# Patient Record
Sex: FEMALE | Race: WHITE | Employment: FULL TIME
[De-identification: names, ages, dates, MRNs, and addresses within clinical notes are randomized per-mention and may not be internally consistent; named-entity substitution may affect disease eponyms.]

---

## 2023-03-08 ENCOUNTER — HOSPITAL ENCOUNTER (OUTPATIENT)
Facility: HOSPITAL | Age: 53
Discharge: HOME | End: 2023-03-11

## 2023-03-08 ENCOUNTER — HOSPITAL ENCOUNTER (OUTPATIENT)
Facility: HOSPITAL | Age: 53
Discharge: HOME | End: 2023-03-11
Payer: COMMERCIAL

## 2023-03-08 LAB
EKG ATRIAL RATE: 66 BPM
EKG DIAGNOSIS: NORMAL
EKG P AXIS: 72 DEGREES
EKG P-R INTERVAL: 136 MS
EKG Q-T INTERVAL: 378 MS
EKG QRS DURATION: 82 MS
EKG QTC CALCULATION (BAZETT): 396 MS
EKG R AXIS: 69 DEGREES
EKG T AXIS: 55 DEGREES
EKG VENTRICULAR RATE: 66 BPM
SENTARA SPECIMEN COLLECTION: NORMAL

## 2023-03-08 PROCEDURE — 99001 SPECIMEN HANDLING PT-LAB: CPT

## 2023-03-08 PROCEDURE — 93005 ELECTROCARDIOGRAM TRACING: CPT | Performed by: ORTHOPAEDIC SURGERY

## 2023-03-08 NOTE — PERIOP NOTE
Neck 14 inches. Denies sleep apnea, diabetes, liver or kidney diseases. Not on lithium or digoxin. Has RA. Added EKG.

## 2023-03-16 ENCOUNTER — ANESTHESIA EVENT (OUTPATIENT)
Facility: HOSPITAL | Age: 53
End: 2023-03-16
Payer: COMMERCIAL

## 2023-03-21 ENCOUNTER — HOSPITAL ENCOUNTER (OUTPATIENT)
Facility: HOSPITAL | Age: 53
Setting detail: OUTPATIENT SURGERY
Discharge: HOME | End: 2023-03-21
Attending: ORTHOPAEDIC SURGERY | Admitting: ORTHOPAEDIC SURGERY
Payer: COMMERCIAL

## 2023-03-21 ENCOUNTER — ANESTHESIA (OUTPATIENT)
Facility: HOSPITAL | Age: 53
End: 2023-03-21
Payer: COMMERCIAL

## 2023-03-21 VITALS
TEMPERATURE: 97.2 F | HEIGHT: 64 IN | DIASTOLIC BLOOD PRESSURE: 75 MMHG | OXYGEN SATURATION: 99 % | BODY MASS INDEX: 28.53 KG/M2 | SYSTOLIC BLOOD PRESSURE: 138 MMHG | WEIGHT: 167.1 LBS | RESPIRATION RATE: 16 BRPM | HEART RATE: 75 BPM

## 2023-03-21 DIAGNOSIS — M20.41 HAMMER TOE OF RIGHT FOOT: Primary | ICD-10-CM

## 2023-03-21 PROCEDURE — C9290 INJ, BUPIVACAINE LIPOSOME: HCPCS | Performed by: ORTHOPAEDIC SURGERY

## 2023-03-21 PROCEDURE — 7100000010 HC PHASE II RECOVERY - FIRST 15 MIN: Performed by: ORTHOPAEDIC SURGERY

## 2023-03-21 PROCEDURE — 6360000002 HC RX W HCPCS: Performed by: ANESTHESIOLOGY

## 2023-03-21 PROCEDURE — 2500000003 HC RX 250 WO HCPCS: Performed by: ANESTHESIOLOGY

## 2023-03-21 PROCEDURE — 81025 URINE PREGNANCY TEST: CPT

## 2023-03-21 PROCEDURE — 2580000003 HC RX 258: Performed by: ORTHOPAEDIC SURGERY

## 2023-03-21 PROCEDURE — 3600000002 HC SURGERY LEVEL 2 BASE: Performed by: ORTHOPAEDIC SURGERY

## 2023-03-21 PROCEDURE — 3600000012 HC SURGERY LEVEL 2 ADDTL 15MIN: Performed by: ORTHOPAEDIC SURGERY

## 2023-03-21 PROCEDURE — 6360000002 HC RX W HCPCS: Performed by: ORTHOPAEDIC SURGERY

## 2023-03-21 PROCEDURE — 3700000000 HC ANESTHESIA ATTENDED CARE: Performed by: ORTHOPAEDIC SURGERY

## 2023-03-21 PROCEDURE — 7100000000 HC PACU RECOVERY - FIRST 15 MIN: Performed by: ORTHOPAEDIC SURGERY

## 2023-03-21 PROCEDURE — 7100000011 HC PHASE II RECOVERY - ADDTL 15 MIN: Performed by: ORTHOPAEDIC SURGERY

## 2023-03-21 PROCEDURE — 7100000001 HC PACU RECOVERY - ADDTL 15 MIN: Performed by: ORTHOPAEDIC SURGERY

## 2023-03-21 PROCEDURE — 3700000001 HC ADD 15 MINUTES (ANESTHESIA): Performed by: ORTHOPAEDIC SURGERY

## 2023-03-21 PROCEDURE — 2500000003 HC RX 250 WO HCPCS: Performed by: ORTHOPAEDIC SURGERY

## 2023-03-21 PROCEDURE — 2709999900 HC NON-CHARGEABLE SUPPLY: Performed by: ORTHOPAEDIC SURGERY

## 2023-03-21 PROCEDURE — 6370000000 HC RX 637 (ALT 250 FOR IP): Performed by: ORTHOPAEDIC SURGERY

## 2023-03-21 RX ORDER — PHENYLEPHRINE HCL IN 0.9% NACL 1 MG/10 ML
SYRINGE (ML) INTRAVENOUS PRN
Status: DISCONTINUED | OUTPATIENT
Start: 2023-03-21 | End: 2023-03-21 | Stop reason: SDUPTHER

## 2023-03-21 RX ORDER — GLYCOPYRROLATE 0.2 MG/ML
INJECTION INTRAMUSCULAR; INTRAVENOUS PRN
Status: DISCONTINUED | OUTPATIENT
Start: 2023-03-21 | End: 2023-03-21 | Stop reason: SDUPTHER

## 2023-03-21 RX ORDER — ONDANSETRON 2 MG/ML
INJECTION INTRAMUSCULAR; INTRAVENOUS PRN
Status: DISCONTINUED | OUTPATIENT
Start: 2023-03-21 | End: 2023-03-21 | Stop reason: SDUPTHER

## 2023-03-21 RX ORDER — IPRATROPIUM BROMIDE AND ALBUTEROL SULFATE 2.5; .5 MG/3ML; MG/3ML
1 SOLUTION RESPIRATORY (INHALATION)
Status: DISCONTINUED | OUTPATIENT
Start: 2023-03-21 | End: 2023-03-21 | Stop reason: HOSPADM

## 2023-03-21 RX ORDER — SODIUM CHLORIDE, SODIUM LACTATE, POTASSIUM CHLORIDE, CALCIUM CHLORIDE 600; 310; 30; 20 MG/100ML; MG/100ML; MG/100ML; MG/100ML
INJECTION, SOLUTION INTRAVENOUS CONTINUOUS
Status: DISCONTINUED | OUTPATIENT
Start: 2023-03-21 | End: 2023-03-21 | Stop reason: HOSPADM

## 2023-03-21 RX ORDER — BUPIVACAINE HYDROCHLORIDE 5 MG/ML
INJECTION, SOLUTION EPIDURAL; INTRACAUDAL PRN
Status: DISCONTINUED | OUTPATIENT
Start: 2023-03-21 | End: 2023-03-21 | Stop reason: ALTCHOICE

## 2023-03-21 RX ORDER — FENTANYL CITRATE 50 UG/ML
INJECTION, SOLUTION INTRAMUSCULAR; INTRAVENOUS PRN
Status: DISCONTINUED | OUTPATIENT
Start: 2023-03-21 | End: 2023-03-21 | Stop reason: SDUPTHER

## 2023-03-21 RX ORDER — LIDOCAINE HYDROCHLORIDE 20 MG/ML
INJECTION, SOLUTION EPIDURAL; INFILTRATION; INTRACAUDAL; PERINEURAL PRN
Status: DISCONTINUED | OUTPATIENT
Start: 2023-03-21 | End: 2023-03-21 | Stop reason: SDUPTHER

## 2023-03-21 RX ORDER — OXYCODONE HYDROCHLORIDE AND ACETAMINOPHEN 5; 325 MG/1; MG/1
1-2 TABLET ORAL EVERY 6 HOURS PRN
Qty: 28 TABLET | Refills: 0 | Status: SHIPPED | OUTPATIENT
Start: 2023-03-21 | End: 2023-03-28

## 2023-03-21 RX ORDER — METOCLOPRAMIDE HYDROCHLORIDE 5 MG/ML
INJECTION INTRAMUSCULAR; INTRAVENOUS PRN
Status: DISCONTINUED | OUTPATIENT
Start: 2023-03-21 | End: 2023-03-21 | Stop reason: SDUPTHER

## 2023-03-21 RX ORDER — FENTANYL CITRATE 50 UG/ML
25 INJECTION, SOLUTION INTRAMUSCULAR; INTRAVENOUS EVERY 5 MIN PRN
Status: DISCONTINUED | OUTPATIENT
Start: 2023-03-21 | End: 2023-03-21 | Stop reason: HOSPADM

## 2023-03-21 RX ORDER — SODIUM CHLORIDE 9 MG/ML
INJECTION, SOLUTION INTRAVENOUS PRN
Status: DISCONTINUED | OUTPATIENT
Start: 2023-03-21 | End: 2023-03-21 | Stop reason: HOSPADM

## 2023-03-21 RX ORDER — ONDANSETRON 4 MG/1
4 TABLET, FILM COATED ORAL 3 TIMES DAILY PRN
Qty: 15 TABLET | Refills: 0 | Status: SHIPPED | OUTPATIENT
Start: 2023-03-21

## 2023-03-21 RX ORDER — HYDROMORPHONE HYDROCHLORIDE 1 MG/ML
0.5 INJECTION, SOLUTION INTRAMUSCULAR; INTRAVENOUS; SUBCUTANEOUS EVERY 5 MIN PRN
Status: DISCONTINUED | OUTPATIENT
Start: 2023-03-21 | End: 2023-03-21 | Stop reason: HOSPADM

## 2023-03-21 RX ORDER — DEXAMETHASONE SODIUM PHOSPHATE 4 MG/ML
INJECTION, SOLUTION INTRA-ARTICULAR; INTRALESIONAL; INTRAMUSCULAR; INTRAVENOUS; SOFT TISSUE PRN
Status: DISCONTINUED | OUTPATIENT
Start: 2023-03-21 | End: 2023-03-21 | Stop reason: SDUPTHER

## 2023-03-21 RX ORDER — SODIUM CHLORIDE 0.9 % (FLUSH) 0.9 %
5-40 SYRINGE (ML) INJECTION PRN
Status: DISCONTINUED | OUTPATIENT
Start: 2023-03-21 | End: 2023-03-21 | Stop reason: HOSPADM

## 2023-03-21 RX ORDER — SODIUM CHLORIDE 0.9 % (FLUSH) 0.9 %
5-40 SYRINGE (ML) INJECTION EVERY 12 HOURS SCHEDULED
Status: DISCONTINUED | OUTPATIENT
Start: 2023-03-21 | End: 2023-03-21 | Stop reason: HOSPADM

## 2023-03-21 RX ORDER — MIDAZOLAM HYDROCHLORIDE 1 MG/ML
INJECTION INTRAMUSCULAR; INTRAVENOUS PRN
Status: DISCONTINUED | OUTPATIENT
Start: 2023-03-21 | End: 2023-03-21 | Stop reason: SDUPTHER

## 2023-03-21 RX ADMIN — SODIUM CHLORIDE, SODIUM LACTATE, POTASSIUM CHLORIDE, AND CALCIUM CHLORIDE: 600; 310; 30; 20 INJECTION, SOLUTION INTRAVENOUS at 12:38

## 2023-03-21 RX ADMIN — FENTANYL CITRATE 100 MCG: 50 INJECTION, SOLUTION INTRAMUSCULAR; INTRAVENOUS at 12:41

## 2023-03-21 RX ADMIN — METOCLOPRAMIDE 10 MG: 5 INJECTION, SOLUTION INTRAMUSCULAR; INTRAVENOUS at 13:30

## 2023-03-21 RX ADMIN — Medication 100 MCG: at 13:26

## 2023-03-21 RX ADMIN — LIDOCAINE HYDROCHLORIDE 100 MG: 20 INJECTION, SOLUTION EPIDURAL; INFILTRATION; INTRACAUDAL; PERINEURAL at 12:46

## 2023-03-21 RX ADMIN — SODIUM CHLORIDE, SODIUM LACTATE, POTASSIUM CHLORIDE, AND CALCIUM CHLORIDE: 600; 310; 30; 20 INJECTION, SOLUTION INTRAVENOUS at 13:12

## 2023-03-21 RX ADMIN — ONDANSETRON HYDROCHLORIDE 4 MG: 2 INJECTION INTRAMUSCULAR; INTRAVENOUS at 13:30

## 2023-03-21 RX ADMIN — GLYCOPYRROLATE 0.2 MG: 0.2 INJECTION, SOLUTION INTRAMUSCULAR; INTRAVENOUS at 13:05

## 2023-03-21 RX ADMIN — DEXAMETHASONE SODIUM PHOSPHATE 4 MG: 4 INJECTION, SOLUTION INTRAMUSCULAR; INTRAVENOUS at 13:30

## 2023-03-21 RX ADMIN — MIDAZOLAM 2 MG: 1 INJECTION INTRAMUSCULAR; INTRAVENOUS at 12:38

## 2023-03-21 RX ADMIN — SODIUM CHLORIDE, SODIUM LACTATE, POTASSIUM CHLORIDE, AND CALCIUM CHLORIDE: 600; 310; 30; 20 INJECTION, SOLUTION INTRAVENOUS at 11:34

## 2023-03-21 RX ADMIN — Medication 2 G: at 12:53

## 2023-03-21 ASSESSMENT — LIFESTYLE VARIABLES: SMOKING_STATUS: 0

## 2023-03-21 ASSESSMENT — PAIN SCALES - GENERAL: PAINLEVEL_OUTOF10: 5

## 2023-03-21 NOTE — PERIOP NOTE
TRANSFER - OUT REPORT:    Verbal report given to [address] on Ally Aguiar  being transferred to Phase 2 for routine progression of patient care       Report consisted of patient's Situation, Background, Assessment and   Recommendations(SBAR). Information from the following report(s) Adult Overview, Surgery Report, Intake/Output, and MAR was reviewed with the receiving nurse. [place] Assessment: No data recorded  Lines:   Peripheral IV 03/21/23 Right;Ventral Forearm (Active)   Site Assessment Clean, dry & intact 03/21/23 1403   Line Status Infusing 03/21/23 1403   Phlebitis Assessment No symptoms 03/21/23 1403   Infiltration Assessment 0 03/21/23 1403   Dressing Status Clean, dry & intact 03/21/23 1403   Dressing Type Transparent 03/21/23 1403        Opportunity for questions and clarification was provided.       Patient transported with:  Registered Nurse

## 2023-03-21 NOTE — INTERVAL H&P NOTE
Update History & Physical    The patient's History and Physical was reviewed with the patient and I examined the patient. There was no change. The surgical site was confirmed by the patient and me. Plan: The risks, benefits, expected outcome, and alternative to the recommended procedure have been discussed with the patient. Patient understands and wants to proceed with the procedure.      Electronically signed by Petra Alvarez MD on 3/21/2023 at 12:09 PM

## 2023-03-21 NOTE — DISCHARGE INSTRUCTIONS
DISCHARGE SUMMARY from Nurse    PATIENT INSTRUCTIONS:    After general anesthesia or intravenous sedation, for 24 hours or while taking prescription Narcotics:  Limit your activities  Do not drive and operate hazardous machinery  Do not make important personal or business decisions  Do  not drink alcoholic beverages  If you have not urinated within 8 hours after discharge, please contact your surgeon on call. Report the following to your surgeon:  Excessive pain, swelling, redness or odor of or around the surgical area  Temperature over 100.5  Nausea and vomiting lasting longer than 4 hours or if unable to take medications  Any signs of decreased circulation or nerve impairment to extremity: change in color, persistent  numbness, tingling, coldness or increase pain  Any questions    What to do at Home:  Recommended activity: ,   REGULAR DIET  AMBULATE WITH BOOT ON AND WEIGHT TO [facility] TO OFFICE AS SCHEDULED    If you experience any of the following symptoms heavy bleeding, fevers, severe pain, circulation changes, please follow up with dr Sea Silveira. *  Please give a list of your current medications to your Primary Care Provider. *  Please update this list whenever your medications are discontinued, doses are      changed, or new medications (including over-the-counter products) are added. *  Please carry medication information at all times in case of emergency situations. These are general instructions for a healthy lifestyle:    No smoking/ No tobacco products/ Avoid exposure to second hand smoke  Surgeon General's Warning:  Quitting smoking now greatly reduces serious risk to your health.     Obesity, smoking, and sedentary lifestyle greatly increases your risk for illness    A healthy diet, regular physical exercise & weight monitoring are important for maintaining a healthy lifestyle    You may be retaining fluid if you have a history of heart failure or if you

## 2023-03-21 NOTE — PERIOP NOTE
Reviewed PTA medication list with patient/caregiver and patient/caregiver denies any additional medications. Patient admits to having a responsible adult care for them at home for at least 24 hours after surgery. Patient encouraged to use gown warming system and informed that using said warming gown to regulate body temperature prior to a procedure has been shown to help reduce the risks of blood clots and infection. Patient's pharmacy of choice verified and documented in PTA medication section. Dual skin assessment & fall risk band verification completed with Salvador Reynolds RN.

## 2023-03-21 NOTE — ANESTHESIA PRE PROCEDURE
GERD (gastroesophageal reflux disease)     Rheumatoid arthritis ([facility] [address]     Wears glasses        Past Surgical History:        Procedure Laterality Date    RECTOCELE REPAIR  2020    SINUS SURGERY      age 22   [de-identified] TUBAL LIGATION  2008       Social History:    Social History     Tobacco Use    Smoking status: Never    Smokeless tobacco: Never   Substance Use Topics    Alcohol use: Yes     Alcohol/week: 1.0 standard drink     Types: 1 Glasses of wine per week     Comment: social                                Counseling given: Not Answered      Vital Signs (Current):   Vitals:    03/08/23 0906 03/21/23 1107   BP:  129/71   Pulse:  71   Resp:  16   Temp:  97.8 °F (36.6 °C)   TempSrc:  Temporal   SpO2:  96%   Weight: 166 lb 11.2 oz (75.6 kg) 167 lb 1.6 oz (75.8 kg)   Height: 5' 3.5\" (1.613 m) 5' 3.5\" (1.613 m)                                              BP Readings from Last 3 Encounters:   03/21/23 129/71       NPO Status: Time of last liquid consumption: 0800 (sip of water with meds)                        Time of last solid consumption: 1800                        Date of last liquid consumption: 03/21/23                        Date of last solid food consumption: 03/20/23    BMI:   Wt Readings from Last 3 Encounters:   03/21/23 167 lb 1.6 oz (75.8 kg)   03/10/23 167 lb (75.8 kg)     Body mass index is 29.14 kg/m². CBC: No results found for: WBC, RBC, HGB, HCT, MCV, RDW, PLT    CMP: No results found for: NA, K, CL, CO2, BUN, CREATININE, GFRAA, AGRATIO, LABGLOM, GLUCOSE, GLU, PROT, CALCIUM, BILITOT, ALKPHOS, AST, ALT    POC Tests: No results for input(s): POCGLU, POCNA, POCK, POCCL, POCBUN, POCHEMO, POCHCT in the last 72 hours.     Coags: No results found for: PROTIME, INR, APTT    HCG (If Applicable): No results found for: PREGTESTUR, PREGSERUM, HCG, HCGQUANT     ABGs: No results found for: PHART, PO2ART, WTN3RAC, QPU7TWU, BEART, [id number]     Type & Screen (If Applicable):  No results found for: LABABO,

## 2023-03-21 NOTE — H&P
instability of the 2nd and 3rd toes. Plan: We discussed nonoperative treatment including Boudin splinting. She has already modified her shoes. She is on immune modulators. She has had injections in the joint, and she would like something more permanent if possible. We discussed that she does have rheumatoid arthritis, and I do not think this would control all of her metatarsophalangeal joints, but since most of the symptoms are at the PIP joint I would recommend a 2nd and 3rd metatarsophalangeal joint open tendon lengthening, capsulotomy, PIP joint resection arthroplasty of the 2nd toe, pinning of the 2nd and 3rd toes. She understands she will be heel weightbearing immediately in a short boot. She can roll through once the wires are out. As far as returning to work, hopefully she can return to work as soon as she is able to tolerate her foot being dependent. This can be an outpatient procedure and lasts about an hour and a half. She would need to talk to the rheumatologist. Most likely she would need to hold her Orencia the week before and for two weeks after to let the wound heal. Risk of infection is low. Recurrent deformity as well as arthritis in the MTP joints would be common with rheumatoid arthritis. She is present for the dictation and understands the plan. We have issued a Cam walker boot today in the office. Dm Steward MD/ Stefanie Danielson  (No PCP listed)

## 2023-03-21 NOTE — BRIEF OP NOTE
Brief Postoperative Note      Patient: Yonatan Laird  YOB: 1970  MRN: [id number]    Date of Procedure: 3/21/2023    Pre-Op Diagnosis: RIGHT FOOT RHEUMATOID ARTHRITIS WITH SECOND AND THIRD METATARSAL PHALANGEAL JOINT AND HAMMER TOE OF THE SECOND    Post-Op Diagnosis: Same       Procedure(s):  RIGHT FOOT HAMMER TOE CORRECTION OF THE SECOND AND  THE THIRD TOE CAPSULOTOMY AND TENOTOMY AND TENDON LENGHTENING OF THE SECOND AND THIRD METATARSAL PHALANGEAL JOINT WITH MINI C-ARM    Surgeon(s):  Petra Alvarez MD    Assistant:  Surgical Assistant: Kenney Silveira    Anesthesia: General    Estimated Blood Loss (mL): less than 50     Complications: None    Specimens:   * No specimens in log *    Implants:  * No implants in log *      Drains: * No LDAs found *    Findings: tight EDL, synovitis 2nd MTP, tight PIP    Electronically signed by Petra Alvarez MD on 3/21/2023 at 1:39 PM

## 2023-03-21 NOTE — PERIOP NOTE
TRANSFER - IN REPORT:    Verbal report received from [address] r[address] on Ally Blades  being received from pacu for routine post-op      Report consisted of patient's Situation, Background, Assessment and   Recommendations(SBAR). Information from the following report(s) Nurse Handoff Report was reviewed with the receiving nurse. Opportunity for questions and clarification was provided. Assessment completed upon patient's arrival to unit and care assumed.

## 2023-03-22 LAB — HCG UR QL: NEGATIVE

## 2023-03-22 NOTE — ANESTHESIA POSTPROCEDURE EVALUATION
Department of Anesthesiology  Postprocedure Note    Patient: Fanny Beltran  MRN: [id number]  YOB: 1970  Date of evaluation: 3/22/2023      Procedure Summary     Date: 03/21/23 Room / Location: THE [facility] 06 / [facility] MAIN OR    Anesthesia Start: 1238 Anesthesia Stop: 1346    Procedure: RIGHT FOOT HAMMER TOE CORRECTION OF THE SECOND AND  THE THIRD TOE CAPSULOTOMY AND TENOTOMY AND TENDON LENGHTENING OF THE SECOND AND THIRD METATARSAL PHALANGEAL JOINT WITH MINI C-ARM (Right: Foot) Diagnosis:       Right foot pain      (RIGHT FOOT RHEUMATOID ARTHRITIS WITH SECOND AND THIRD METATARSAL PHALANGEAL JOINT AND HAMMER TOE OF THE SECOND)    Surgeons: Maurice Choi MD Responsible Provider: Lainey Steinberg MD    Anesthesia Type: General ASA Status: 3          Anesthesia Type: General    Sam Phase I: Sam Score: 9    Sam Phase II: Sam Score: 9      Anesthesia Post Evaluation    Patient location during evaluation: PACU  Patient participation: complete - patient participated  Level of consciousness: awake and alert  Pain score: 0  Airway patency: patent  Nausea & Vomiting: no nausea and no vomiting  Complications: no  Cardiovascular status: blood pressure returned to baseline  Respiratory status: acceptable  Hydration status: euvolemic  Multimodal analgesia pain management approach

## 2023-03-22 NOTE — OP NOTE
fair amount of synovitis in the second MTP joint. We debrided, used a McGlamry to free up the joint, and then proceeded to the third metatarsophalangeal joint. Again, an open Z-lengthening of the EDL, cutting the EDB, capsulotomy, not quite as much synovitis in the second toe, but again, the Sher Camron was used to free up the plantar aspect of the toe. I then performed a PIP joint resection arthroplasty of the second, taking an ellipse of skin over the distal aspect of the proximal phalanx, and taking the proximal phalanx bone at the distal aspect, at the neck of the proximal phalanx. After this, I took a 0.045 K-wire out the tip of the toe and down the proximal phalanx. I then went to the third toe, which was still slightly fixed and again, ellipsed out skin, took a small portion of the distal aspect of proximal phalanx. I put the wire up the tip of the toe, then down the proximal phalanx using fluoroscopic assistance, ensuring we were at the center-center position over the proximal aspect of the proximal phalanx, reduced the metatarsophalangeal joint, placed in slight plantar flexion, and pinned the wire across the MTP joint. This has good cascade, good alignment, good capillary refill. I washed the wounds out with normal saline with Betadine. I repaired the extensor digitorum longus with Ethibond. I closed the skin with Vicryl and then nylon. We closed the skin for the hammertoes with nylon only. We then placed pin caps, washed the foot and placed Xeroform, soft dressing, short boot. She was extubated, transferred to Recovery without evidence of intraoperative complications. She will be heel weightbearing, see me in the office in two weeks for wound check, dressing change. Medications will be sent to her pharmacy.       MD NAIDA [email or web address]/[email or web address]  D:  03/21/2023 13:43  T:  03/22/2023 1:01  JOB #:  [id number]

## 2023-10-25 ENCOUNTER — OFFICE VISIT (OUTPATIENT)
Age: 53
End: 2023-10-25

## 2023-10-25 VITALS — WEIGHT: 164 LBS | BODY MASS INDEX: 29.06 KG/M2 | HEIGHT: 63 IN

## 2023-10-25 DIAGNOSIS — Z87.39 HISTORY OF RHEUMATOID ARTHRITIS: ICD-10-CM

## 2023-10-25 DIAGNOSIS — M18.11 ARTHRITIS OF CARPOMETACARPAL (CMC) JOINT OF RIGHT THUMB: Primary | ICD-10-CM

## 2023-10-25 NOTE — PROGRESS NOTES
Haja Lagunas is a 46 y.o. female right handed. Worker's Compensation and legal considerations: none    Chief Complaint   Patient presents with    Hand Pain     Right hand pain ( Thumb)     Pain Score:   5    HPI: Patient presents today with complaints of right thumb base pain. She also reports a history of rheumatoid arthritis. She is currently on medication for this.     Date of onset: Indeterminate  Injury: No  Prior Treatment:  No    ROS: Review of Systems - General ROS: negative except HPI    Past Medical History:   Diagnosis Date    Arthritis     instability in C 5-6    Chronic pain     from arthritis    GERD (gastroesophageal reflux disease)     Rheumatoid arthritis ([address])     Wears glasses        Past Surgical History:   Procedure Laterality Date    HAMMER TOE SURGERY Right 3/21/2023    RIGHT FOOT HAMMER TOE CORRECTION OF THE SECOND AND  THE THIRD TOE CAPSULOTOMY AND TENOTOMY AND TENDON LENGHTENING OF THE SECOND AND THIRD METATARSAL PHALANGEAL JOINT WITH MINI C-ARM performed by Orville Doyle MD at [address]    SINUS SURGERY      age 22    TUBAL LIGATION  2008        Current Outpatient Medications   Medication Sig Dispense Refill    ondansetron (ZOFRAN) 4 MG tablet Take 1 tablet by mouth 3 times daily as needed for Nausea or Vomiting 15 tablet 0    Abatacept (ORENCIA CLICKJECT) 248 MG/ML SOAJ 125 mg Takes on sundays      citalopram (CELEXA) 40 MG tablet Take 40 mg by mouth daily      naproxen sodium (ANAPROX) 220 MG tablet Take 220 mg by mouth daily Takes 2  tablets daily      Multiple Vitamins-Minerals (ONE-A-DAY 50 PLUS PO) Take 1 tablet by mouth daily      omeprazole (PRILOSEC) 20 MG delayed release capsule Take 20 mg by mouth Daily       Current Facility-Administered Medications   Medication Dose Route Frequency Provider Last Rate Last Admin    triamcinolone acetonide (KENALOG) injection 5 mg  5 mg Intra-artICUlar Once Harman Pool, DO           Allergies   Allergen

## 2023-11-01 ENCOUNTER — TELEPHONE (OUTPATIENT)
Age: 53
End: 2023-11-01

## 2023-11-01 NOTE — TELEPHONE ENCOUNTER
Liu Soliz from Dr Jennifer Sanchez office is requesting office note for visit on 10/25 faxed to her at [phone]. Their phone number is [phone].

## (undated) DEVICE — PADDING CAST W4INXL4YD ST COT COHESIVE HND TEARABLE SPEC

## (undated) DEVICE — TOWEL,OR,DSP,ST,BLUE,STD,4/PK,20PK/CS: Brand: MEDLINE

## (undated) DEVICE — GARMENT,MEDLINE,DVT,INT,CALF,MED, GEN2: Brand: MEDLINE

## (undated) DEVICE — APPLICATOR MEDICATED 26 CC SOLUTION HI LT ORNG CHLORAPREP

## (undated) DEVICE — Device: Brand: MICROAIRE®

## (undated) DEVICE — DRAPE EQUIP CARM MINI STRL

## (undated) DEVICE — DRESSING,GAUZE,XEROFORM,CURAD,1"X8",ST: Brand: CURAD

## (undated) DEVICE — DRILL BIT, Ø2.0 MM: Brand: CONMED

## (undated) DEVICE — SPONGE LAPAROTOMY W18XL18IN WHITE STRUNG RADIOPAQUE STERILE

## (undated) DEVICE — SUTURE ETHLN SZ 4-0 L18IN NONABSORBABLE BLK L19MM PS-2 3/8 1667H

## (undated) DEVICE — PRECISION (9.0 X 0.51 X 25.0MM)

## (undated) DEVICE — BANDAGE COMPR W4INXL10YD WHITE/BEIGE E MTRX HK LOOP CLSR

## (undated) DEVICE — PACK PROCEDURE SURG EXTREMITY CUST

## (undated) DEVICE — GLOVE ORANGE PI 8 1/2   MSG9085